# Patient Record
Sex: FEMALE | Race: WHITE | URBAN - METROPOLITAN AREA
[De-identification: names, ages, dates, MRNs, and addresses within clinical notes are randomized per-mention and may not be internally consistent; named-entity substitution may affect disease eponyms.]

---

## 2020-01-08 ENCOUNTER — HISTORICAL (OUTPATIENT)
Dept: ADMINISTRATIVE | Facility: HOSPITAL | Age: 65
End: 2020-01-08

## 2020-01-08 LAB
ABS NEUT (OLG): 3.5 X10(3)/MCL (ref 2.1–9.2)
ALBUMIN SERPL-MCNC: 4.5 GM/DL (ref 3.4–5)
ALBUMIN/GLOB SERPL: 1.96 {RATIO} (ref 1.5–2.5)
ALP SERPL-CCNC: 52 UNIT/L (ref 38–126)
ALT SERPL-CCNC: 20 UNIT/L (ref 7–52)
AST SERPL-CCNC: 23 UNIT/L (ref 15–37)
BILIRUB SERPL-MCNC: 1 MG/DL (ref 0.2–1)
BILIRUBIN DIRECT+TOT PNL SERPL-MCNC: 0.2 MG/DL (ref 0–0.5)
BILIRUBIN DIRECT+TOT PNL SERPL-MCNC: 0.8 MG/DL
BUN SERPL-MCNC: 14 MG/DL (ref 7–18)
CALCIUM SERPL-MCNC: 9.1 MG/DL (ref 8.5–10)
CHLORIDE SERPL-SCNC: 99 MMOL/L (ref 98–107)
CHOLEST SERPL-MCNC: 209 MG/DL (ref 0–200)
CHOLEST/HDLC SERPL: 2.9 {RATIO}
CO2 SERPL-SCNC: 27 MMOL/L (ref 21–32)
CREAT SERPL-MCNC: 0.8 MG/DL (ref 0.6–1.3)
ERYTHROCYTE [DISTWIDTH] IN BLOOD BY AUTOMATED COUNT: 14.1 % (ref 11.5–17)
GLOBULIN SER-MCNC: 2.3 GM/DL (ref 1.2–3)
GLUCOSE SERPL-MCNC: 108 MG/DL (ref 74–106)
HCT VFR BLD AUTO: 39.2 % (ref 37–47)
HDLC SERPL-MCNC: 73 MG/DL (ref 35–60)
HGB BLD-MCNC: 13.3 GM/DL (ref 12–16)
LDLC SERPL CALC-MCNC: 123 MG/DL (ref 0–129)
LYMPHOCYTES # BLD AUTO: 1.5 X10(3)/MCL (ref 0.6–3.4)
LYMPHOCYTES NFR BLD AUTO: 28.1 % (ref 13–40)
MCH RBC QN AUTO: 29.5 PG (ref 27–31.2)
MCHC RBC AUTO-ENTMCNC: 34 GM/DL (ref 32–36)
MCV RBC AUTO: 87 FL (ref 80–94)
MONOCYTES # BLD AUTO: 0.5 X10(3)/MCL (ref 0.1–1.3)
MONOCYTES NFR BLD AUTO: 8.9 % (ref 0.1–24)
NEUTROPHILS NFR BLD AUTO: 63 % (ref 47–80)
PLATELET # BLD AUTO: 280 X10(3)/MCL (ref 130–400)
PMV BLD AUTO: 8.2 FL (ref 9.4–12.4)
POTASSIUM SERPL-SCNC: 4.5 MMOL/L (ref 3.5–5.1)
PROT SERPL-MCNC: 6.8 GM/DL (ref 6.4–8.2)
RBC # BLD AUTO: 4.51 X10(6)/MCL (ref 4.2–5.4)
SODIUM SERPL-SCNC: 137 MMOL/L (ref 136–145)
TRIGL SERPL-MCNC: 79 MG/DL (ref 30–150)
TSH SERPL-ACNC: 1.08 MIU/ML (ref 0.35–4.94)
VLDLC SERPL CALC-MCNC: 15.8 MG/DL
WBC # SPEC AUTO: 5.5 X10(3)/MCL (ref 4.5–11.5)

## 2022-04-10 ENCOUNTER — HISTORICAL (OUTPATIENT)
Dept: ADMINISTRATIVE | Facility: HOSPITAL | Age: 67
End: 2022-04-10

## 2022-04-25 VITALS
HEIGHT: 65 IN | SYSTOLIC BLOOD PRESSURE: 110 MMHG | BODY MASS INDEX: 20.54 KG/M2 | DIASTOLIC BLOOD PRESSURE: 76 MMHG | WEIGHT: 123.25 LBS

## 2022-05-03 NOTE — HISTORICAL [facility] CERNER
This is a historical note converted from [employer]. Formatting and pictures may have been removed.  Please reference [employer] for original formatting and attached multimedia. Chief Complaint  WELLNESS CPX FAST, EVAL MRI RT SHOULDER LUMP  History of Present Illness  Patient presents today to establish primary care and for a wellness exam. ?She is a 64-year-old  female?whose main complaint is a?lump to her right shoulder area, she had a an ultrasound?and MRI indicating that it is a lipoma but is?intercalated in the muscle bed. ?She is here to discuss that.? She states that it has?been stable in size?since she first noticed it in September of last year.  ?  Social history:?Drinks 1-2 alcoholic beverages per week, no history of tobacco use, no history of recreational drugs.? He is  has 2 adult children?and retired.  ?  Family history:?Mother living in her 80s breast cancer survivor has hypertension.? Father passed away with complications of liver failure from hepatitis B.? He had a history of hypertension, diabetes and was status post MI.  ?  Other physicians:  Dr. Brian, GYN?up-to-date with female exam?and has mammogram scheduled  ?  ?  Review of Systems  Constitutional:?No fever, no weakness, no weight loss, no fatigue  Eye: ? ? ? ? ? ? ? ???No eye redness, drainage, or pain  ENMT:??? ? ? ? ? ? No sore?throat pain, postnasal drainage, ?or mucus production  Respiratory:????No wheezing,?no shortness of breath  Cardiovascular:??No chest pain, no TRINH  Gastrointestinal:?No nausea, vomiting, or diarrhea. No abdominal pain, no hematochezia or melena  Musculoskeletal:?As per above?right upper extremity, otherwise normal  Integumentary:??? No skin rash or abnormal lesion  Neuro:??No headaches, dizziness, or weakness  Psych:?No anxiety, depression, or SI/HI  Endo:??No polyuria, polydipsia, or polyphagia  Heme/Lymph:??No bruising or lymphadenopathy  Physical Exam  Vitals & Measurements  T:?36.9? ?C (Oral)?  HR:?60(Peripheral)? BP:?110/76?  HT:?165?cm? WT:?55.9?kg? BMI:?20.53?  General: ???? ? ? ? ? Well developed, well-nourished, in no acute distress  Eye: ? ? ? ? ? ? ? ? ? ??Clear conjunctiva, eyelids normal  HENT:??? ? ? ? ? ? ? ? No erythema, No exudate or swelling, TMs clear  Neck:??? ? ? ? ? ? ? ? ?Full range of motion, No cervical lymphadenopathy  Respiratory:??? ? ?Clear to auscultation bilaterally  Cardiovascular:?Regular rate and rhythm without murmurs, gallops or rubs  Abdomen: ? ? ? ? ?Soft, NT, ND, NABS x4, no HSM  Musculoskeletal:?Area of concern is?anterior?right upper extremity?area of proximal bicep?muscle,?3-1/2 cm?soft, freely movable, well-defined?lesion?no indication of infection?area is?nontender.?no CCE  Neuro: ? ? ? ? ? ? ? ? ?No motor/sensory deficits, Reflexes 2+  Integumentary: ??No rashes or skin lesions present  LN:?WNL  Assessment/Plan  1.?Wellness examination?Z00.00  ?Normal exam except for lipoma  Ordered:  CBC w/ Auto Diff, Routine collect, 01/08/20 11:30:00 CST, Blood, Order for future visit, Stop date 01/08/20 11:30:00 CST, Lab Collect, Wellness examination, 01/08/20 11:30:00 CST  Clinic Follow up, *Est. 01/08/21 3:00:00 CST, PLEASE MAKE THIS A 15 MINUTE PHYSICAL, Order for future visit, Wellness examination, [facility]  Comprehensive Metabolic Panel, Routine collect, 01/08/20 11:30:00 CST, Blood, Order for future visit, Stop date 01/08/20 11:30:00 CST, Lab Collect, Wellness examination, 01/08/20 11:30:00 CST  Lipid Panel, Now collect, 01/08/20 11:30:00 CST, Blood, Order for future visit, Stop date 01/08/20 11:30:00 CST, Lab Collect, Wellness examination, 01/08/20 11:30:00 CST  Preventative Health Care Est 40-64 years [id number] PC, Wellness examination  Lipoma of arm, [facility], 01/08/20 11:47:00 CST  Thyroid Stimulating Hormone, Routine collect, 01/08/20 11:30:00 CST, Blood, Order for future visit, Stop date 01/08/20 11:30:00 CST, Lab Collect, Wellness examination, 01/08/20 11:30:00  CST  ?  2.?Lipoma of arm?D17.20  ?Pt wishes it removed, but concern over scar, wishes it be done per Plastic surgery  Ordered:  Preventative Health Care Est 40-64 years [id number] PC, Wellness examination  Lipoma of arm, [facility], 01/08/20 11:47:00 CST  ?  Orders:  Lab Collection Request, 01/08/20 11:30:00 CST, [facility], 01/08/20 11:30:00 CST  Referrals  Clinic Follow up, *Est. 01/08/21 3:00:00 CST, PLEASE MAKE THIS A 15 MINUTE PHYSICAL, Order for future visit, Wellness examination, [facility]   Problem List/Past Medical History  Ongoing  Postmenopausal  Historical  Able to lie down  CL - Contact lens  ET - Exercise tolerance  Other  Wears glasses  Procedure/Surgical History  Achilles Tendon Repair (Right) (03/20/2015)  Other suture of tendon (03/20/2015)  Repair, primary, open or percutaneous, ruptured Achilles tendon; (03/20/2015)  Colonoscopy  Extraction of impacted wisdom tooth  Tonsillectomy   Medications  compound hormone repalcement, Oral, At Bedtime  valACYclovir 1 g oral tablet, 2 gm= 2 tab(s), Oral, BID  Allergies  No Known Medication Allergies  Social History  Abuse/Neglect  No, 01/08/2020  Alcohol  Current, Wine, 1-2 times per week, 03/18/2015  Employment/School  Unemployed, Highest education level: University degree(s)., 03/18/2015  Exercise  Exercise duration: 60. Exercise frequency: 5-6 times/week. Exercise type: Walking, Aerobics, Swimming., 01/08/2020  Home/Environment  Lives with Spouse., 01/08/2020  Nutrition/Health  Regular, 01/08/2020  Substance Use - Denies Substance Abuse, 03/18/2015  Tobacco - Denies Tobacco Use, 03/18/2015  Never (less than 100 in lifetime), N/A, 01/08/2020  Family History  Acute myocardial infarction.: Father and Grandfather.  CAD (coronary artery disease)...: Mother and Father.  Diabetes mellitus type 2: Father.  Hepatitis B.: Mother and Father.  Hypertension.: Mother and Father.  Lung cancer: Grandmother.  Primary malignant neoplasm of breast: Mother.  Primary  malignant neoplasm of colon: Grandfather.  Immunizations  Vaccine Date Status   zoster vaccine, inactivated 11/19/2019 Recorded   influenza virus vaccine, inactivated 11/19/2019 Recorded   influenza virus vaccine, inactivated 12/22/2015 Recorded   tetanus/diphtheria/pertussis, acel(Tdap) 12/22/2015 Recorded   influenza virus vaccine, inactivated 01/14/2013 Recorded   Health Maintenance  Health Maintenance  ???Pending?(in the next year)  ??? ??Due?  ??? ? ? ?Alcohol Misuse Screening due??01/01/20??and every 1??year(s)  ??? ? ? ?ADL Screening due??01/08/20??and every 1??year(s)  ??? ? ? ?Aspirin Therapy for CVD Prevention due??01/08/20??and every 1??year(s)  ??? ??Due In Future?  ??? ? ? ?Obesity Screening not due until??01/01/21??and every 1??year(s)  ???Satisfied?(in the past 1 year)  ??? ??Satisfied?  ??? ? ? ?Blood Pressure Screening on??01/08/20.??Satisfied by Cindy Stewart LPN  ??? ? ? ?Body Mass Index Check on??01/08/20.??Satisfied by Cindy Stewart LPN  ??? ? ? ?Depression Screening on??01/08/20.??Satisfied by Cindy Stewart LPN  ??? ? ? ?Influenza Vaccine on??11/19/19.??Satisfied by Cindy Stewart LPN  ??? ? ? ?Obesity Screening on??01/08/20.??Satisfied by Cindy Stewart LPN  ??? ? ? ?Zoster Vaccine on??11/19/19.??Satisfied by Cindy Stewart LPN  ?